# Patient Record
Sex: MALE | Race: WHITE
[De-identification: names, ages, dates, MRNs, and addresses within clinical notes are randomized per-mention and may not be internally consistent; named-entity substitution may affect disease eponyms.]

---

## 2019-03-06 ENCOUNTER — HOSPITAL ENCOUNTER (OUTPATIENT)
Dept: HOSPITAL 33 - SDC | Age: 54
Discharge: HOME | End: 2019-03-06
Attending: FAMILY MEDICINE
Payer: MEDICARE

## 2019-03-06 VITALS — OXYGEN SATURATION: 97 % | DIASTOLIC BLOOD PRESSURE: 74 MMHG | HEART RATE: 72 BPM | SYSTOLIC BLOOD PRESSURE: 112 MMHG

## 2019-03-06 DIAGNOSIS — R11.0: ICD-10-CM

## 2019-03-06 DIAGNOSIS — E11.9: ICD-10-CM

## 2019-03-06 DIAGNOSIS — J44.9: ICD-10-CM

## 2019-03-06 DIAGNOSIS — K30: Primary | ICD-10-CM

## 2019-03-06 DIAGNOSIS — R14.2: ICD-10-CM

## 2019-03-06 PROCEDURE — 82962 GLUCOSE BLOOD TEST: CPT

## 2019-03-06 NOTE — OP
SURGERY DATE/TIME:   03/06/2019  0713



PREOPERATIVE DIAGNOSES:     

1) Persistent nausea.  

2) Belching. 



POSTOPERATIVE DIAGNOSIS:     Presumed delayed gastric emptying. 



PROCEDURE:     EGD. 



SURGEON: Sage Gamboa M.D.



ANESTHESIA:  MAC by Hood Jauregui CRNA. 



ESTIMATED BLOOD LOSS:  None. 



SPECIMENS:  None.          



DESCRIPTION OF PROCEDURE: After informed written consent was obtained, the patient was 
taken to the endoscopy suite. He had a bite block inserted and then anesthesia was 
titrated to the desired level of consciousness. The endoscope was inserted into the 
posterior oropharynx and under direct visualization the esophagus was traversed. There 
were no mucosal abnormalities of the esophagus.  Upon entry into the gastric cavity there 
was normal rugated gastric mucosa with retained food particles present in the cavity. 
There were no obvious mucosal abnormalities. The gastric antrum was inspected and the 
pylorus was traversed and the duodenum had a normal mucosal appearance as well. Upon 
withdrawal retroflexion revealed no appreciable hiatal hernia. 

Again, food particles were present in the stomach. No other abnormalities were 
encountered. The scope was removed and the patient was transferred to the recovery room in 
good condition. I had a discussion with his wife and she is certain that he did not eat 
anything for ten hours at least prior to having scope done which suggests delayed gastric 
emptying. I have advised that he follow up with Dr. Bradley for consideration of gastric 
emptying study at this time.